# Patient Record
Sex: FEMALE | Race: WHITE | URBAN - METROPOLITAN AREA
[De-identification: names, ages, dates, MRNs, and addresses within clinical notes are randomized per-mention and may not be internally consistent; named-entity substitution may affect disease eponyms.]

---

## 2021-10-05 ENCOUNTER — OFFICE VISIT (OUTPATIENT)
Dept: URBAN - METROPOLITAN AREA CLINIC 91 | Facility: CLINIC | Age: 76
End: 2021-10-05
Payer: MEDICARE

## 2021-10-05 DIAGNOSIS — H26.491 OTHER SECONDARY CATARACT, RIGHT EYE: Primary | ICD-10-CM

## 2021-10-05 DIAGNOSIS — H02.835 DERMATOCHALASIS OF LEFT LOWER EYELID: ICD-10-CM

## 2021-10-05 DIAGNOSIS — H02.834 DERMATOCHALASIS OF LEFT UPPER EYELID: ICD-10-CM

## 2021-10-05 DIAGNOSIS — H52.4 PRESBYOPIA: ICD-10-CM

## 2021-10-05 DIAGNOSIS — H04.123 DRY EYE SYNDROME OF BILATERAL LACRIMAL GLANDS: ICD-10-CM

## 2021-10-05 PROCEDURE — 99214 OFFICE O/P EST MOD 30 MIN: CPT | Performed by: SPECIALIST

## 2021-10-05 ASSESSMENT — VISUAL ACUITY
OD: 20/25
OS: 20/20

## 2021-10-05 ASSESSMENT — INTRAOCULAR PRESSURE
OS: 11
OD: 14

## 2021-10-05 NOTE — IMPRESSION/PLAN
Impression: Presbyopia: H52.4. Plan: For dry eye syndrome, I have recommended patient use a good quality artificial tear 4-6 times per day.

## 2021-10-05 NOTE — IMPRESSION/PLAN
Impression: Presbyopia: H52.4. Plan: Patient has requested Prisim glasses, instructed patient to go see Dr. Nicholas Alvarez as scheduled to have them measured and made there.

## 2021-10-05 NOTE — IMPRESSION/PLAN
Impression: Presbyopia: H52.4. Plan: Drooping upper eyelids were discussed with patient. Patient will let us know when they feel upper lids are affecting their field of vision or daily life.

## 2022-12-15 ENCOUNTER — OFFICE VISIT (OUTPATIENT)
Dept: URBAN - METROPOLITAN AREA CLINIC 91 | Facility: CLINIC | Age: 77
End: 2022-12-15
Payer: MEDICARE

## 2022-12-15 DIAGNOSIS — H26.491 OTHER SECONDARY CATARACT, RIGHT EYE: Primary | ICD-10-CM

## 2022-12-15 DIAGNOSIS — H04.123 DRY EYE SYNDROME OF BILATERAL LACRIMAL GLANDS: ICD-10-CM

## 2022-12-15 PROCEDURE — 99214 OFFICE O/P EST MOD 30 MIN: CPT | Performed by: SPECIALIST

## 2022-12-15 ASSESSMENT — INTRAOCULAR PRESSURE
OD: 15
OS: 14

## 2023-12-14 ENCOUNTER — OFFICE VISIT (OUTPATIENT)
Dept: URBAN - METROPOLITAN AREA CLINIC 91 | Facility: CLINIC | Age: 78
End: 2023-12-14
Payer: MEDICARE

## 2023-12-14 DIAGNOSIS — H43.812 VITREOUS DEGENERATION, LEFT EYE: ICD-10-CM

## 2023-12-14 DIAGNOSIS — H26.491 OTHER SECONDARY CATARACT, RIGHT EYE: Primary | ICD-10-CM

## 2023-12-14 DIAGNOSIS — H55.09 OTHER FORMS OF NYSTAGMUS: ICD-10-CM

## 2023-12-14 PROCEDURE — 99213 OFFICE O/P EST LOW 20 MIN: CPT | Performed by: OPHTHALMOLOGY

## 2023-12-14 ASSESSMENT — INTRAOCULAR PRESSURE
OS: 14
OD: 15